# Patient Record
(demographics unavailable — no encounter records)

---

## 2024-10-15 NOTE — REASON FOR VISIT
[Home] : at home, [unfilled] , at the time of the visit. [Medical Office: ([facility])___] : at the medical office located in  [Follow-Up Visit] : a follow-up [Spouse] : spouse

## 2024-10-15 NOTE — ASSESSMENT
[FreeTextEntry1] : Mr. Padron is a 51-year-old gentleman with metastatic uveal melanoma who has experienced disease progression to temozolomide/bevacizumab/pembrolizumab/TACE and tebentafusp/immunoembolization, and most recently received ipilimumab and nivolumab beginning on 05/10/2024 with his fourth dose held due to diarrhea and pneumonitis requiring a steroid taper.  He has been off all active therapy since 06/21/2024.    At this time, I will review the images with radiology and anticipate placing Mr. Padron on close expectant observation with every 2 month imaging and no maintenance immunotherapy given the response and his prior toxicities to treatment. We will otherwise plan to see him back here with repeat imaging studies in 2 months time.

## 2024-10-15 NOTE — HISTORY OF PRESENT ILLNESS
[de-identified] : DIANGNOSIS:  M1a, stage IV, uveal melanoma involving the liver, gallbladder and possibly the lymph nodes  MOLECULAR FINDINGS: HLA  positive  NGS results from [place]: GNAQ R183Q mut, ALVA copy number gain, MYC copy number gain, PBRM1 K904fs and I279fs, PRKDC exon 24 mut TMB 12.17 mut/Mb PDL1 1% RAJESH  CURRENT THERAPY:   Expectant observation since 06/21/2024 (4 months)  PRIOR THERAPIES: Unk - 04/03/2022: Temozolomide, bevacizumab and pembrolizumab  03/2022 - 04/03/2022:  TACE (cisplatin and fotemustine) added to the systemic regimen above  Tebentafusp (since 04/26/2022) administered concurrently with immunoembolization from 06/2023 to 12/2023  Ipilimumab and nivolumab (C1: 05/10/2024; C2: 5/31/2024; C3: 6/21/2024; C4: scheduled for 07/16/2024 held due to the development of diarrhea and cough)  INTERVAL HISTORY: Santiago Padron is a 51 year old gentleman with an M1a, stage IV, uveal melanoma with hepatic metastases who returns to clinic today for continued management of his disease.  He has previously been treated at [facility], most recently under the care of Dr. Leyda Goode.  He has also been cared for by Dr. Ray Bueno of interventional radiology. He has experienced disease progression to temozolomide/bevacizumab/pembrolizumab/TACE and tebentafusp/immunoembolization, and most recently received ipilimumab and nivolumab beginning on 05/10/2024 with his fourth dose held due to diarrhea and pneumonitis requiring a steroid taper.  He has been off all active therapy since 06/21/2024.  Please see my prior notes for his complex history.  Since the patient was last seen, he has completed his steroid taper with no recurrence of his diarrhea or cough. He does report occasional nausea and some decreased appetite without change in weight.  On 08/12/2024, he underwent a chest CT and an abdominal MRI which demonstrated a treatment response.  Since the patient was last seen here in clinic, he was seen by Dr. Cristino Couch of dermatology and started on treatment with topical opzelura. Clinically, he has otherwise done generally well with the exception of some discomfort at his right eye without new visual changes.   On 10/11/2024, he underwent a chest CT and an abdominal MRI which reportedly revealed no significant changes when compared with prior images.  I was unable to personally view these images during this visit.   PAST MEDICAL HISTORY: 1. Hypertension  SOCIAL HISTORY: The patient is .  He denies tobacco or alcohol.

## 2024-10-15 NOTE — REVIEW OF SYSTEMS
[Eye Pain] : eye pain [Diarrhea: Grade 0] : Diarrhea: Grade 0 [Skin Rash] : skin rash [Negative] : Allergic/Immunologic

## 2025-01-27 NOTE — REASON FOR VISIT
[Follow-Up Visit] : a follow-up [Spouse] : spouse [Home] : at home, [unfilled] , at the time of the visit. [Medical Office: ([facility])___] : at the medical office located in

## 2025-01-27 NOTE — REASON FOR VISIT
[Follow-Up Visit] : a follow-up [Spouse] : spouse [Home] : at home, [unfilled] , at the time of the visit. [Medical Office: ([facility])___] : at the medical office located in

## 2025-01-28 NOTE — HISTORY OF PRESENT ILLNESS
[de-identified] : 1/28/2025 Interval History - 1/28/2025 Patient presents for a follow-up visit. Since the last appointment, the following events have occurred: 1. Specialist Consultation:    - Follow-up with Dr. Hussain Wiggins on 10/23/2024 2. Recent Imaging Studies (performed on 1/23/2025):    a) CT Chest:       - No interval change compared to 10/11/2024 study       - No evidence of new pulmonary metastases    b) MR Abdomen:       - Segment 8 hepatic metastasis (near liver dome):         * Decreased in size compared to previous studies         * Primarily cystic in nature       - Right lobe:         * Resolution of previously noted peripheral hepatic metastases (compared to 6/19/2024)         * Inferior lesion: Grossly stable in size       - No new sites of disease identified    [de-identified] : DIANGNOSIS:  M1a, stage IV, uveal melanoma involving the liver, gallbladder and possibly the lymph nodes  MOLECULAR FINDINGS: HLA  positive  NGS results from [place]: GNAQ R183Q mut, ALVA copy number gain, MYC copy number gain, PBRM1 K904fs and I279fs, PRKDC exon 24 mut TMB 12.17 mut/Mb PDL1 1% RAJESH  CURRENT THERAPY:   Expectant observation since 06/21/2024   PRIOR THERAPIES: Unk - 04/03/2022: Temozolomide, bevacizumab and pembrolizumab  03/2022 - 04/03/2022:  TACE (cisplatin and fotemustine) added to the systemic regimen above  Tebentafusp (since 04/26/2022) administered concurrently with immunoembolization from 06/2023 to 12/2023  Ipilimumab and nivolumab (C1: 05/10/2024; C2: 5/31/2024; C3: 6/21/2024; C4: scheduled for 07/16/2024 held due to the development of diarrhea and cough)  INTERVAL HISTORY: Santiago Padron is a 51-year-old gentleman with an M1a, stage IV, uveal melanoma with hepatic metastases who returns to clinic today for continued management of his disease.  He has previously been treated at [facility], most recently under the care of Dr. Leyda Goode.  He has also been cared for by Dr. Ray Bueno of interventional radiology. He has experienced disease progression to temozolomide/bevacizumab/pembrolizumab/TACE and tebentafusp/immunoembolization, and most recently received ipilimumab and nivolumab beginning on 05/10/2024 with his fourth dose held due to diarrhea and pneumonitis requiring a steroid taper.  He has been off all active therapy since 06/21/2024.  Please see my prior notes for his complex history.  Since the patient was last seen, he has completed his steroid taper with no recurrence of his diarrhea or cough. He does report occasional nausea and some decreased appetite without change in weight.  On 08/12/2024, he underwent a chest CT and an abdominal MRI which demonstrated a treatment response.  Since the patient was last seen here in clinic, he was seen by Dr. Cristino Couch of dermatology and started on treatment with topical opzelura. Clinically, he has otherwise done generally well with the exception of some discomfort at his right eye without new visual changes.   On 10/11/2024, he underwent a chest CT and an abdominal MRI which reportedly revealed no significant changes when compared with prior images.  I was unable to personally view these images during this visit.  1/27/2025 Patient had PET performed last week, which revealed: compared to PET/CT 10/22/2024, decrease in size and similar FDG avidity in the medial left inguinal region lymph node. Similar size and mild decrease in FDG avidity of a left lateral inguinal region lymph node. 2. No new FDG avid lesions.    PAST MEDICAL HISTORY: 1. Hypertension  SOCIAL HISTORY: The patient is .  He denies tobacco or alcohol.

## 2025-01-28 NOTE — ASSESSMENT
[FreeTextEntry1] : Mr. Padron, a 51-year-old male with metastatic uveal melanoma, has undergone various treatments including temozolomide, bevacizumab, pembrolizumab, TACE, tebentafusp, and immunoembolization, all of which ultimately led to disease progression. He was subsequently treated with a combination therapy of Ipi/NIvo. Unfortunately, his treatment regimen was disrupted after the 4th dose due to side effects such as diarrhea and pneumonitis, which required management with a steroid taper. Since 06/21/2024, Mr. Padron has been off all active treatments.  Currently, he is under close surveillance with regular imaging scheduled every three months to monitor his condition. The most recent scans show no new disease activity. We plan to continue this monitoring approach and will see Mr. Padron again in three months for follow-up imaging, including a CT of the chest and an MR of the abdomen.

## 2025-01-28 NOTE — HISTORY OF PRESENT ILLNESS
[de-identified] : 1/28/2025 Interval History - 1/28/2025 Patient presents for a follow-up visit. Since the last appointment, the following events have occurred: 1. Specialist Consultation:    - Follow-up with Dr. Hussain Wiggins on 10/23/2024 2. Recent Imaging Studies (performed on 1/23/2025):    a) CT Chest:       - No interval change compared to 10/11/2024 study       - No evidence of new pulmonary metastases    b) MR Abdomen:       - Segment 8 hepatic metastasis (near liver dome):         * Decreased in size compared to previous studies         * Primarily cystic in nature       - Right lobe:         * Resolution of previously noted peripheral hepatic metastases (compared to 6/19/2024)         * Inferior lesion: Grossly stable in size       - No new sites of disease identified    [de-identified] : DIANGNOSIS:  M1a, stage IV, uveal melanoma involving the liver, gallbladder and possibly the lymph nodes  MOLECULAR FINDINGS: HLA  positive  NGS results from [place]: GNAQ R183Q mut, ALVA copy number gain, MYC copy number gain, PBRM1 K904fs and I279fs, PRKDC exon 24 mut TMB 12.17 mut/Mb PDL1 1% RAJESH  CURRENT THERAPY:   Expectant observation since 06/21/2024   PRIOR THERAPIES: Unk - 04/03/2022: Temozolomide, bevacizumab and pembrolizumab  03/2022 - 04/03/2022:  TACE (cisplatin and fotemustine) added to the systemic regimen above  Tebentafusp (since 04/26/2022) administered concurrently with immunoembolization from 06/2023 to 12/2023  Ipilimumab and nivolumab (C1: 05/10/2024; C2: 5/31/2024; C3: 6/21/2024; C4: scheduled for 07/16/2024 held due to the development of diarrhea and cough)  INTERVAL HISTORY: Santiago Padron is a 51-year-old gentleman with an M1a, stage IV, uveal melanoma with hepatic metastases who returns to clinic today for continued management of his disease.  He has previously been treated at [facility], most recently under the care of Dr. Leyda Goode.  He has also been cared for by Dr. Ray Bueno of interventional radiology. He has experienced disease progression to temozolomide/bevacizumab/pembrolizumab/TACE and tebentafusp/immunoembolization, and most recently received ipilimumab and nivolumab beginning on 05/10/2024 with his fourth dose held due to diarrhea and pneumonitis requiring a steroid taper.  He has been off all active therapy since 06/21/2024.  Please see my prior notes for his complex history.  Since the patient was last seen, he has completed his steroid taper with no recurrence of his diarrhea or cough. He does report occasional nausea and some decreased appetite without change in weight.  On 08/12/2024, he underwent a chest CT and an abdominal MRI which demonstrated a treatment response.  Since the patient was last seen here in clinic, he was seen by Dr. Cristino Couch of dermatology and started on treatment with topical opzelura. Clinically, he has otherwise done generally well with the exception of some discomfort at his right eye without new visual changes.   On 10/11/2024, he underwent a chest CT and an abdominal MRI which reportedly revealed no significant changes when compared with prior images.  I was unable to personally view these images during this visit.  1/27/2025 Patient had PET performed last week, which revealed: compared to PET/CT 10/22/2024, decrease in size and similar FDG avidity in the medial left inguinal region lymph node. Similar size and mild decrease in FDG avidity of a left lateral inguinal region lymph node. 2. No new FDG avid lesions.    PAST MEDICAL HISTORY: 1. Hypertension  SOCIAL HISTORY: The patient is .  He denies tobacco or alcohol.

## 2025-03-27 NOTE — HISTORY OF PRESENT ILLNESS
[de-identified] : DIANGNOSIS:  M1a, stage IV, uveal melanoma involving the liver, gallbladder and possibly the lymph nodes  MOLECULAR FINDINGS: HLA  positive  NGS results from [place]: GNAQ R183Q mut, ALVA copy number gain, MYC copy number gain, PBRM1 K904fs and I279fs, PRKDC exon 24 mut TMB 12.17 mut/Mb PDL1 1% RAJESH  CURRENT THERAPY:   Expectant observation since 06/21/2024 (9 months)  PRIOR THERAPIES: Unk - 04/03/2022: Temozolomide, bevacizumab and pembrolizumab  03/2022 - 04/03/2022:  TACE (cisplatin and fotemustine) added to the systemic regimen above  Tebentafusp (since 04/26/2022) administered concurrently with immunoembolization from 06/2023 to 12/2023  Ipilimumab and nivolumab (C1: 05/10/2024; C2: 5/31/2024; C3: 6/21/2024; C4: scheduled for 07/16/2024 held due to the development of diarrhea and cough)  INTERVAL HISTORY: Santiago Padron is a 51-year-old gentleman with an M1a, stage IV, uveal melanoma with hepatic metastases who returns to clinic today for continued management of his disease.  He has previously been treated at [facility], most recently under the care of Dr. Leyda Goode.  He has also been cared for by Dr. Ray Bueno of interventional radiology. He has experienced disease progression to temozolomide/bevacizumab/pembrolizumab/TACE and tebentafusp/immunoembolization, and most recently received ipilimumab and nivolumab beginning on 05/10/2024 with his fourth dose held due to diarrhea and pneumonitis requiring a steroid taper.  He has been off all active therapy since 06/21/2024.  Please see my prior notes for his complex history.  He is being followed by Dr. Cristino Couch of dermatology and has been started on treatment with topical ruxolitinib.  Clinically, he has otherwise done generally well and is leaving tomorrow evening for a 1 month trip to [place].    On 03/24/2025, he underwent a chest CT and an abdominal MRI. Although the formal radiology review is pending, based upon my review, there is a hepatic lesion visible on diffusion weighted imaging (series 12 image 8) of the MRI which is not clearly present on prior scans.   PAST MEDICAL HISTORY: 1. Hypertension  SOCIAL HISTORY: The patient is .  He denies tobacco or alcohol.

## 2025-03-27 NOTE — ASSESSMENT
[FreeTextEntry1] : Mr. Padron is a 51-year-old gentleman with metastatic uveal melanoma who has experienced disease progression to temozolomide/bevacizumab/pembrolizumab/TACE and tebentafusp/immunoembolization, and most recently received ipilimumab and nivolumab beginning on 05/10/2024 with his fourth dose held due to diarrhea and pneumonitis requiring a steroid taper.  He has been off all active therapy since 06/21/2024.  At this time, I will review the images with radiology and will arrange appropriate followup for the hepatic finding.

## 2025-03-27 NOTE — REVIEW OF SYSTEMS
[Diarrhea: Grade 0] : Diarrhea: Grade 0 [Negative] : Allergic/Immunologic [de-identified] : Fidelina

## 2025-03-27 NOTE — PHYSICAL EXAM
[Fully active, able to carry on all pre-disease performance without restriction] : Status 0 - Fully active, able to carry on all pre-disease performance without restriction [Normal] : well developed, well nourished, in no acute distress [de-identified] : Diffuse vitiligo

## 2025-03-27 NOTE — REASON FOR VISIT
[Home] : at home, [unfilled] , at the time of the visit. [Medical Office: ([facility])___] : at the medical office located in  [Telehealth (audio & video)] : This visit was provided via telehealth using real-time 2-way audio visual technology. [Verbal consent obtained from patient] : the patient, [unfilled] [Follow-Up Visit] : a follow-up

## 2025-07-01 NOTE — REASON FOR VISIT
[Home] : at home, [unfilled] , at the time of the visit. [Medical Office: ([facility])___] : at the medical office located in  [Telehealth (audio & video)] : This visit was provided via telehealth using real-time 2-way audio visual technology. [Verbal consent obtained from patient] : the patient, [unfilled] [Follow-Up Visit] : a follow-up

## 2025-07-01 NOTE — HISTORY OF PRESENT ILLNESS
[de-identified] : DIANGNOSIS:  M1a, stage IV, uveal melanoma involving the liver, gallbladder and possibly the lymph nodes  MOLECULAR FINDINGS: HLA  positive  NGS results from [place]: GNAQ R183Q mut, ALVA copy number gain, MYC copy number gain, PBRM1 K904fs and I279fs, PRKDC exon 24 mut TMB 12.17 mut/Mb PDL1 1% RAJESH  CURRENT THERAPY:   Expectant observation since 06/21/2024 (1 year)  PRIOR THERAPIES: Unk - 04/03/2022: Temozolomide, bevacizumab and pembrolizumab  03/2022 - 04/03/2022:  TACE (cisplatin and fotemustine) added to the systemic regimen above  Tebentafusp (since 04/26/2022) administered concurrently with immunoembolization from 06/2023 to 12/2023  Ipilimumab and nivolumab (C1: 05/10/2024; C2: 5/31/2024; C3: 6/21/2024; C4: scheduled for 07/16/2024 held due to the development of diarrhea and cough)  INTERVAL HISTORY: Santiago Padron is a 51-year-old gentleman with an M1a, stage IV, uveal melanoma with hepatic metastases who returns to clinic today for continued management of his disease.  He has previously been treated at [facility], most recently under the care of Dr. Leyda Goode.  He has also been cared for by Dr. Ray Bueno of interventional radiology. He has experienced disease progression to temozolomide/bevacizumab/pembrolizumab/TACE and tebentafusp/immunoembolization, and most recently received ipilimumab and nivolumab beginning on 05/10/2024 with his fourth dose held due to diarrhea and pneumonitis requiring a steroid taper.  He has been off all active therapy since 06/21/2024.  Please see my prior notes for his complex history.  He is being followed by Dr. Cristino Couch of dermatology and has been started on treatment with topical ruxolitinib.  Clinically, he has otherwise done generally well and is leaving tomorrow evening for a 1 month trip to [place].    On 06/23/2025, he underwent a chest CT and an abdominal MRI. Although the formal radiology review is pending, based upon my review, there is no significant change in the hepatic findings, with no overt evidence for disease progression.   PAST MEDICAL HISTORY: 1. Hypertension  SOCIAL HISTORY: The patient is .  He denies tobacco or alcohol.